# Patient Record
(demographics unavailable — no encounter records)

---

## 2025-03-12 NOTE — HISTORY OF PRESENT ILLNESS
[FreeTextEntry1] : L shoulder instability.  [de-identified] : 32 yrs old M with pmx of seasonal allergies, Gilbert's disease, b/l varicocele comes in to establish care and for annual exam. L shoulder instability-PT complains of chronic L shoulder instability for the past 3 yrs, no recent trauma, denies any redness, swelling, fevers.  b/l varicocele- has chronic b/l varicocele for the past 3-4 yrs, no pain, no swelling or redness, no fevers or chills.  No other new complains.  Denies any chest pain, sob, palpitations, cough, fevers, abd pain, vomiting, diarrhea, rash.   sleep- fine appetite- good mood- good    covid- 4 doses tdap- 01/2016 STD- not needed skin- will refer

## 2025-03-12 NOTE — HEALTH RISK ASSESSMENT
[Good] : ~his/her~  mood as  good [0] : 2) Feeling down, depressed, or hopeless: Not at all (0) [Never] : Never [[id number]] : 0

## 2025-03-12 NOTE — ASSESSMENT
[FreeTextEntry1] : 32 yrs old M with pmx of seasonal allergies, Gilbert's disease, b/l varicocele comes in to establish care and for annual exam.   #L shoulder instability- PT complains of chronic L shoulder instability for the past 3 yrs, no recent trauma, denies any redness, swelling, fevers.   Plan: ortho sports medicine referral.   # b/l varicocele-  has chronic b/l varicocele for the past 3-4 yrs, no pain, no swelling or redness, no fevers or chills.  Plan: urology referral.